# Patient Record
(demographics unavailable — no encounter records)

---

## 2024-10-22 NOTE — PHYSICAL EXAM
[Normal TMs] : both tympanic membranes were normal [Supple] : supple [Clear to Auscultation] : lungs were clear to auscultation bilaterally [Normal S1, S2] : normal S1 and S2 [Soft] : abdomen soft [Normal] : affect was normal and insight and judgment were intact

## 2024-10-22 NOTE — HISTORY OF PRESENT ILLNESS
[FreeTextEntry1] : Patient presents for follow up evaluation for multiple medical concerns [de-identified] : Patient presents for follow up evaluation for multiple medical concerns including: hypertension, diabetes mellitus, hypercholesterolemia.  and obesity,  dietary/weight loss consultation

## 2024-10-22 NOTE — ASSESSMENT
[FreeTextEntry1] : DISCUSSED BLOOD WORK RESULTS FROM 10/10/2024   # Weight management weight loss counseling provided as above  discussed balanced diet Water : should drink ample amount of water  REGULAR exercise regimen  Preventive medicine discussed - including importance of lifestyle modification - with incorporation of healthy diet + regular exercise, intermittent fasting   weight loss followup Bariatric surgery history: none Overweight / obesity comorbidities: hld htn t2dm Current anti-obesity medications: ozempic Obesity medication side effects: none  -encouraged continued efforts with diet -cont ozempic -2 - 3 mo follow up.      Plan of Care: 1. Will continue ozempic 2mg weekly. Instructions reviewed with patient. Stressed [id number] steps daily

## 2024-11-20 NOTE — PHYSICAL EXAM
[Supple] : supple [Clear to Auscultation] : lungs were clear to auscultation bilaterally [Normal S1, S2] : normal S1 and S2 [Soft] : abdomen soft [Normal] : affect was normal and insight and judgment were intact

## 2024-11-20 NOTE — HISTORY OF PRESENT ILLNESS
[FreeTextEntry1] : Patient presents for follow up evaluation for multiple medical concerns [de-identified] : Patient presents for follow up evaluation for multiple medical concerns including: hypertension - not compliant with medication  diabetes mellitus, hypercholesterolemia, and obesity, dietary/weight loss consultation

## 2024-11-20 NOTE — ASSESSMENT
[FreeTextEntry1] :  weight loss followup Patient on metformin and ozempic Bariatric surgery history: none Overweight / obesity comorbidities: hld htn t2dm Current anti-obesity medications: ozempic Obesity medication side effects: none  -encouraged continued efforts with diet -cont ozempic   # Hypertension- restarted on bp meds. Follow blood pressure Eat a Healthy Diet. Choose healthy meal and snack options to help you avoid high blood pressure and its complications Keep Yourself at a Healthy Weight Be Physically Active Do Not Smoke Limit How Much Alcohol You Drink Get Enough Sleep

## 2025-01-21 NOTE — COUNSELING
[Benefits of weight loss discussed] : Benefits of weight loss discussed [Structured Weight Management Program suggested:] : Structured weight management program suggested [Encouraged to maintain food diary] : Encouraged to maintain food diary [Encouraged to increase physical activity] : Encouraged to increase physical activity [Encouraged to use exercise tracking device] : Encouraged to use exercise tracking device

## 2025-01-21 NOTE — HISTORY OF PRESENT ILLNESS
[FreeTextEntry1] : Patient presents for follow up evaluation for multiple medical concerns including: [de-identified] : Patient presents for follow up evaluation for multiple medical concerns including: hypertension, diabetes mellitus, hypercholesterolemia, and obesity, dietary/weight loss consultation

## 2025-01-21 NOTE — PLAN
1.92
[FreeTextEntry1] : # Weight management weight loss counseling provided as above  discussed balanced diet Water : should drink ample amount of water  REGULAR exercise regimen  Preventive medicine discussed - including importance of lifestyle modification - with incorporation of healthy diet + regular exercise, intermittent fasting